# Patient Record
Sex: MALE | Race: WHITE | NOT HISPANIC OR LATINO | URBAN - METROPOLITAN AREA
[De-identification: names, ages, dates, MRNs, and addresses within clinical notes are randomized per-mention and may not be internally consistent; named-entity substitution may affect disease eponyms.]

---

## 2017-02-01 ENCOUNTER — OFFICE VISIT - HEALTHEAST (OUTPATIENT)
Dept: FAMILY MEDICINE | Facility: CLINIC | Age: 32
End: 2017-02-01

## 2017-02-01 DIAGNOSIS — I10 ESSENTIAL HYPERTENSION WITH GOAL BLOOD PRESSURE LESS THAN 140/90: ICD-10-CM

## 2017-02-01 DIAGNOSIS — K21.9 GERD (GASTROESOPHAGEAL REFLUX DISEASE): ICD-10-CM

## 2017-02-01 ASSESSMENT — MIFFLIN-ST. JEOR: SCORE: 2093.59

## 2017-06-07 ENCOUNTER — COMMUNICATION - HEALTHEAST (OUTPATIENT)
Dept: FAMILY MEDICINE | Facility: CLINIC | Age: 32
End: 2017-06-07

## 2017-06-07 DIAGNOSIS — K21.9 GERD (GASTROESOPHAGEAL REFLUX DISEASE): ICD-10-CM

## 2017-11-24 ENCOUNTER — COMMUNICATION - HEALTHEAST (OUTPATIENT)
Dept: FAMILY MEDICINE | Facility: CLINIC | Age: 32
End: 2017-11-24

## 2017-12-18 ENCOUNTER — COMMUNICATION - HEALTHEAST (OUTPATIENT)
Dept: FAMILY MEDICINE | Facility: CLINIC | Age: 32
End: 2017-12-18

## 2017-12-18 DIAGNOSIS — I10 ESSENTIAL HYPERTENSION: ICD-10-CM

## 2017-12-19 ENCOUNTER — COMMUNICATION - HEALTHEAST (OUTPATIENT)
Dept: FAMILY MEDICINE | Facility: CLINIC | Age: 32
End: 2017-12-19

## 2017-12-19 DIAGNOSIS — I10 HTN (HYPERTENSION): ICD-10-CM

## 2018-02-28 ENCOUNTER — COMMUNICATION - HEALTHEAST (OUTPATIENT)
Dept: FAMILY MEDICINE | Facility: CLINIC | Age: 33
End: 2018-02-28

## 2018-02-28 DIAGNOSIS — K21.9 GERD (GASTROESOPHAGEAL REFLUX DISEASE): ICD-10-CM

## 2018-02-28 DIAGNOSIS — I10 HTN (HYPERTENSION): ICD-10-CM

## 2018-03-05 ENCOUNTER — COMMUNICATION - HEALTHEAST (OUTPATIENT)
Dept: FAMILY MEDICINE | Facility: CLINIC | Age: 33
End: 2018-03-05

## 2018-03-05 DIAGNOSIS — I10 ESSENTIAL HYPERTENSION: ICD-10-CM

## 2018-04-06 ENCOUNTER — COMMUNICATION - HEALTHEAST (OUTPATIENT)
Dept: FAMILY MEDICINE | Facility: CLINIC | Age: 33
End: 2018-04-06

## 2018-04-06 DIAGNOSIS — K21.9 GERD (GASTROESOPHAGEAL REFLUX DISEASE): ICD-10-CM

## 2018-04-14 ENCOUNTER — COMMUNICATION - HEALTHEAST (OUTPATIENT)
Dept: FAMILY MEDICINE | Facility: CLINIC | Age: 33
End: 2018-04-14

## 2018-04-14 DIAGNOSIS — K21.9 GERD (GASTROESOPHAGEAL REFLUX DISEASE): ICD-10-CM

## 2018-04-14 DIAGNOSIS — I10 HTN (HYPERTENSION): ICD-10-CM

## 2018-04-18 ENCOUNTER — COMMUNICATION - HEALTHEAST (OUTPATIENT)
Dept: FAMILY MEDICINE | Facility: CLINIC | Age: 33
End: 2018-04-18

## 2018-04-18 DIAGNOSIS — I10 HTN (HYPERTENSION): ICD-10-CM

## 2018-04-22 ENCOUNTER — COMMUNICATION - HEALTHEAST (OUTPATIENT)
Dept: FAMILY MEDICINE | Facility: CLINIC | Age: 33
End: 2018-04-22

## 2018-04-22 DIAGNOSIS — I10 ESSENTIAL HYPERTENSION: ICD-10-CM

## 2018-04-23 ENCOUNTER — COMMUNICATION - HEALTHEAST (OUTPATIENT)
Dept: FAMILY MEDICINE | Facility: CLINIC | Age: 33
End: 2018-04-23

## 2018-04-23 DIAGNOSIS — I10 ESSENTIAL HYPERTENSION: ICD-10-CM

## 2018-05-23 ENCOUNTER — COMMUNICATION - HEALTHEAST (OUTPATIENT)
Dept: FAMILY MEDICINE | Facility: CLINIC | Age: 33
End: 2018-05-23

## 2018-05-23 ENCOUNTER — OFFICE VISIT - HEALTHEAST (OUTPATIENT)
Dept: FAMILY MEDICINE | Facility: CLINIC | Age: 33
End: 2018-05-23

## 2018-05-23 ENCOUNTER — COMMUNICATION - HEALTHEAST (OUTPATIENT)
Dept: TELEHEALTH | Facility: CLINIC | Age: 33
End: 2018-05-23

## 2018-05-23 DIAGNOSIS — K21.9 GERD (GASTROESOPHAGEAL REFLUX DISEASE): ICD-10-CM

## 2018-05-23 DIAGNOSIS — I10 ESSENTIAL HYPERTENSION: ICD-10-CM

## 2018-05-23 DIAGNOSIS — Z00.00 HEALTHCARE MAINTENANCE: ICD-10-CM

## 2018-05-23 DIAGNOSIS — R73.09 OTHER ABNORMAL GLUCOSE: ICD-10-CM

## 2018-05-23 DIAGNOSIS — E78.2 MIXED HYPERLIPIDEMIA: ICD-10-CM

## 2018-05-23 DIAGNOSIS — F84.0 AUTISM SPECTRUM DISORDER: ICD-10-CM

## 2018-05-23 LAB
ALT SERPL W P-5'-P-CCNC: 21 U/L (ref 0–45)
ANION GAP SERPL CALCULATED.3IONS-SCNC: 14 MMOL/L (ref 5–18)
BUN SERPL-MCNC: 24 MG/DL (ref 8–22)
CALCIUM SERPL-MCNC: 10.1 MG/DL (ref 8.5–10.5)
CHLORIDE BLD-SCNC: 103 MMOL/L (ref 98–107)
CHOLEST SERPL-MCNC: 221 MG/DL
CO2 SERPL-SCNC: 26 MMOL/L (ref 22–31)
CREAT SERPL-MCNC: 1.02 MG/DL (ref 0.7–1.3)
FASTING STATUS PATIENT QL REPORTED: NO
GFR SERPL CREATININE-BSD FRML MDRD: >60 ML/MIN/1.73M2
GLUCOSE BLD-MCNC: 74 MG/DL (ref 70–125)
HBA1C MFR BLD: 5.7 % (ref 3.5–6)
HDLC SERPL-MCNC: 55 MG/DL
LDLC SERPL CALC-MCNC: 127 MG/DL
POTASSIUM BLD-SCNC: 3.9 MMOL/L (ref 3.5–5)
SODIUM SERPL-SCNC: 143 MMOL/L (ref 136–145)
TRIGL SERPL-MCNC: 196 MG/DL

## 2018-05-23 NOTE — ASSESSMENT & PLAN NOTE
Only diagnosed with Asperger's, not seeing mental health professional currently and reports doing well.  Mood issues about a year ago when his brother took his own life.  Patient states he is interacting well with coworkers.  Has history of intermittent explosive disorder but denies recent problems

## 2018-05-23 NOTE — ASSESSMENT & PLAN NOTE
Exercising at least 30 minutes 5 days a week?  No, needs to work on this, works at [employer] so job is active but agrees that he should do more exercise outside of work  Assessment of/ comments on efforts to lose weight (if applicable): Trying to eat well  Assessment of/comments on diet: States eats lots of vegetables and does well with this  Lab Results   Component Value Date    HGBA1C 5.3 04/11/2011     Plan: A1c ordered today, recommend more exercise  Follow-up: With blood pressure follow-up

## 2018-05-23 NOTE — ASSESSMENT & PLAN NOTE
Hypertension is poorly controlled  BP Readings from Last 3 Encounters:   05/23/18 152/72   02/01/17 120/74   08/01/16 134/74     Comment: Patient works nights and it has been a long time since he took his last dose of medication.  Also, concerned about diastolic blood pressure being too low at recent trip to the dentist  Current antihypertensives amlodipine 10, losartan 100, hydrochlorothiazide 25  Plan: Recommend checking blood pressures at home, following up if remains above 140 or above 90.  Discussed goal being around 130/80.  Follow-up 2 weeks for nurse only visit to recheck blood pressure.  No change in medication at this time, continue to work on diet and exercise.  Basic metabolic profile ordered  Follow-up: 6 months and as above

## 2018-05-24 ENCOUNTER — COMMUNICATION - HEALTHEAST (OUTPATIENT)
Dept: FAMILY MEDICINE | Facility: CLINIC | Age: 33
End: 2018-05-24

## 2018-05-28 ENCOUNTER — COMMUNICATION - HEALTHEAST (OUTPATIENT)
Dept: FAMILY MEDICINE | Facility: CLINIC | Age: 33
End: 2018-05-28

## 2018-05-28 DIAGNOSIS — I10 ESSENTIAL HYPERTENSION: ICD-10-CM

## 2018-06-12 ENCOUNTER — AMBULATORY - HEALTHEAST (OUTPATIENT)
Dept: NURSING | Facility: CLINIC | Age: 33
End: 2018-06-12

## 2018-07-07 ENCOUNTER — COMMUNICATION - HEALTHEAST (OUTPATIENT)
Dept: FAMILY MEDICINE | Facility: CLINIC | Age: 33
End: 2018-07-07

## 2018-07-07 DIAGNOSIS — I10 HTN (HYPERTENSION): ICD-10-CM

## 2018-07-07 DIAGNOSIS — K21.9 GERD (GASTROESOPHAGEAL REFLUX DISEASE): ICD-10-CM

## 2018-08-13 ENCOUNTER — COMMUNICATION - HEALTHEAST (OUTPATIENT)
Dept: FAMILY MEDICINE | Facility: CLINIC | Age: 33
End: 2018-08-13

## 2018-08-13 DIAGNOSIS — K21.9 GERD (GASTROESOPHAGEAL REFLUX DISEASE): ICD-10-CM

## 2018-08-13 DIAGNOSIS — I10 HTN (HYPERTENSION): ICD-10-CM

## 2019-01-16 ENCOUNTER — COMMUNICATION - HEALTHEAST (OUTPATIENT)
Dept: FAMILY MEDICINE | Facility: CLINIC | Age: 34
End: 2019-01-16

## 2019-01-16 DIAGNOSIS — I10 ESSENTIAL HYPERTENSION: ICD-10-CM

## 2019-08-26 ENCOUNTER — COMMUNICATION - HEALTHEAST (OUTPATIENT)
Dept: FAMILY MEDICINE | Facility: CLINIC | Age: 34
End: 2019-08-26

## 2019-08-26 DIAGNOSIS — I10 HTN (HYPERTENSION): ICD-10-CM

## 2019-09-10 ENCOUNTER — COMMUNICATION - HEALTHEAST (OUTPATIENT)
Dept: FAMILY MEDICINE | Facility: CLINIC | Age: 34
End: 2019-09-10

## 2019-09-10 DIAGNOSIS — I10 ESSENTIAL HYPERTENSION: ICD-10-CM

## 2019-10-08 ENCOUNTER — COMMUNICATION - HEALTHEAST (OUTPATIENT)
Dept: FAMILY MEDICINE | Facility: CLINIC | Age: 34
End: 2019-10-08

## 2020-03-03 ENCOUNTER — COMMUNICATION - HEALTHEAST (OUTPATIENT)
Dept: FAMILY MEDICINE | Facility: CLINIC | Age: 35
End: 2020-03-03

## 2020-03-03 DIAGNOSIS — I10 ESSENTIAL HYPERTENSION: ICD-10-CM

## 2020-04-13 ENCOUNTER — COMMUNICATION - HEALTHEAST (OUTPATIENT)
Dept: FAMILY MEDICINE | Facility: CLINIC | Age: 35
End: 2020-04-13

## 2020-04-13 DIAGNOSIS — I10 ESSENTIAL HYPERTENSION: ICD-10-CM

## 2020-06-04 ENCOUNTER — COMMUNICATION - HEALTHEAST (OUTPATIENT)
Dept: FAMILY MEDICINE | Facility: CLINIC | Age: 35
End: 2020-06-04

## 2020-06-04 DIAGNOSIS — I10 ESSENTIAL HYPERTENSION: ICD-10-CM

## 2020-07-09 ENCOUNTER — COMMUNICATION - HEALTHEAST (OUTPATIENT)
Dept: FAMILY MEDICINE | Facility: CLINIC | Age: 35
End: 2020-07-09

## 2020-07-09 DIAGNOSIS — I10 ESSENTIAL HYPERTENSION: ICD-10-CM

## 2020-07-21 ENCOUNTER — COMMUNICATION - HEALTHEAST (OUTPATIENT)
Dept: FAMILY MEDICINE | Facility: CLINIC | Age: 35
End: 2020-07-21

## 2020-08-29 ENCOUNTER — COMMUNICATION - HEALTHEAST (OUTPATIENT)
Dept: FAMILY MEDICINE | Facility: CLINIC | Age: 35
End: 2020-08-29

## 2020-08-29 DIAGNOSIS — I10 ESSENTIAL HYPERTENSION: ICD-10-CM

## 2020-09-09 ENCOUNTER — COMMUNICATION - HEALTHEAST (OUTPATIENT)
Dept: FAMILY MEDICINE | Facility: CLINIC | Age: 35
End: 2020-09-09

## 2020-09-21 ENCOUNTER — COMMUNICATION - HEALTHEAST (OUTPATIENT)
Dept: FAMILY MEDICINE | Facility: CLINIC | Age: 35
End: 2020-09-21

## 2020-09-21 DIAGNOSIS — I10 HTN (HYPERTENSION): ICD-10-CM

## 2020-09-28 ENCOUNTER — COMMUNICATION - HEALTHEAST (OUTPATIENT)
Dept: FAMILY MEDICINE | Facility: CLINIC | Age: 35
End: 2020-09-28

## 2020-10-13 ENCOUNTER — COMMUNICATION - HEALTHEAST (OUTPATIENT)
Dept: FAMILY MEDICINE | Facility: CLINIC | Age: 35
End: 2020-10-13

## 2020-10-13 DIAGNOSIS — I10 ESSENTIAL HYPERTENSION: ICD-10-CM

## 2020-10-13 RX ORDER — AMLODIPINE BESYLATE 10 MG/1
TABLET ORAL
Qty: 30 TABLET | Refills: 11 | Status: SHIPPED | OUTPATIENT
Start: 2020-10-13 | End: 2021-10-21

## 2020-10-18 ENCOUNTER — COMMUNICATION - HEALTHEAST (OUTPATIENT)
Dept: FAMILY MEDICINE | Facility: CLINIC | Age: 35
End: 2020-10-18

## 2020-10-18 DIAGNOSIS — I10 HTN (HYPERTENSION): ICD-10-CM

## 2020-10-28 ENCOUNTER — OFFICE VISIT - HEALTHEAST (OUTPATIENT)
Dept: FAMILY MEDICINE | Facility: CLINIC | Age: 35
End: 2020-10-28

## 2020-10-28 DIAGNOSIS — I10 ESSENTIAL HYPERTENSION: ICD-10-CM

## 2020-10-28 DIAGNOSIS — R73.09 OTHER ABNORMAL GLUCOSE: ICD-10-CM

## 2020-10-28 DIAGNOSIS — E78.2 MIXED HYPERLIPIDEMIA: ICD-10-CM

## 2020-10-28 DIAGNOSIS — Z00.00 HEALTHCARE MAINTENANCE: ICD-10-CM

## 2020-10-28 DIAGNOSIS — F17.200 TOBACCO USE DISORDER: ICD-10-CM

## 2020-10-28 NOTE — ASSESSMENT & PLAN NOTE
Poorly controlled here, even on recheck was 142/80.  Patient on maximum dose of 3 antihypertensives prescribed, [id number], losartan 100 and Norvasc.-Though could go up on hydrochlorothiazide.  I think patient should get a home blood pressure monitor, check blood pressures, bring it in in 1 month to review and calibrate.  He is agreeable.  We will hold off on labs including BMP until this time as well.

## 2020-11-02 ENCOUNTER — COMMUNICATION - HEALTHEAST (OUTPATIENT)
Dept: FAMILY MEDICINE | Facility: CLINIC | Age: 35
End: 2020-11-02

## 2020-11-02 DIAGNOSIS — I10 HTN (HYPERTENSION): ICD-10-CM

## 2020-11-25 ENCOUNTER — COMMUNICATION - HEALTHEAST (OUTPATIENT)
Dept: FAMILY MEDICINE | Facility: CLINIC | Age: 35
End: 2020-11-25

## 2020-11-25 DIAGNOSIS — I10 ESSENTIAL HYPERTENSION: ICD-10-CM

## 2020-12-09 ENCOUNTER — OFFICE VISIT - HEALTHEAST (OUTPATIENT)
Dept: FAMILY MEDICINE | Facility: CLINIC | Age: 35
End: 2020-12-09

## 2020-12-09 DIAGNOSIS — Z00.00 HEALTHCARE MAINTENANCE: ICD-10-CM

## 2020-12-09 DIAGNOSIS — E78.2 MIXED HYPERLIPIDEMIA: ICD-10-CM

## 2020-12-09 DIAGNOSIS — I10 ESSENTIAL HYPERTENSION: ICD-10-CM

## 2020-12-09 DIAGNOSIS — R73.09 OTHER ABNORMAL GLUCOSE: ICD-10-CM

## 2020-12-09 LAB
ALT SERPL W P-5'-P-CCNC: 26 U/L (ref 0–45)
ANION GAP SERPL CALCULATED.3IONS-SCNC: 12 MMOL/L (ref 5–18)
BUN SERPL-MCNC: 23 MG/DL (ref 8–22)
CALCIUM SERPL-MCNC: 9.3 MG/DL (ref 8.5–10.5)
CHLORIDE BLD-SCNC: 102 MMOL/L (ref 98–107)
CHOLEST SERPL-MCNC: 230 MG/DL
CO2 SERPL-SCNC: 27 MMOL/L (ref 22–31)
CREAT SERPL-MCNC: 1.13 MG/DL (ref 0.7–1.3)
FASTING STATUS PATIENT QL REPORTED: YES
GFR SERPL CREATININE-BSD FRML MDRD: >60 ML/MIN/1.73M2
GLUCOSE BLD-MCNC: 81 MG/DL (ref 70–125)
HBA1C MFR BLD: 5.4 %
HCV AB SERPL QL IA: NEGATIVE
HDLC SERPL-MCNC: 58 MG/DL
LDLC SERPL CALC-MCNC: 153 MG/DL
POTASSIUM BLD-SCNC: 3.9 MMOL/L (ref 3.5–5)
SODIUM SERPL-SCNC: 141 MMOL/L (ref 136–145)
TRIGL SERPL-MCNC: 94 MG/DL

## 2020-12-09 ASSESSMENT — MIFFLIN-ST. JEOR: SCORE: 2042.56

## 2020-12-10 ENCOUNTER — COMMUNICATION - HEALTHEAST (OUTPATIENT)
Dept: FAMILY MEDICINE | Facility: CLINIC | Age: 35
End: 2020-12-10

## 2020-12-30 ENCOUNTER — COMMUNICATION - HEALTHEAST (OUTPATIENT)
Dept: FAMILY MEDICINE | Facility: CLINIC | Age: 35
End: 2020-12-30

## 2020-12-30 DIAGNOSIS — I10 ESSENTIAL HYPERTENSION: ICD-10-CM

## 2021-02-01 ENCOUNTER — COMMUNICATION - HEALTHEAST (OUTPATIENT)
Dept: FAMILY MEDICINE | Facility: CLINIC | Age: 36
End: 2021-02-01

## 2021-02-01 DIAGNOSIS — I10 ESSENTIAL HYPERTENSION: ICD-10-CM

## 2021-02-01 RX ORDER — LOSARTAN POTASSIUM 100 MG/1
TABLET ORAL
Qty: 30 TABLET | Refills: 10 | Status: SHIPPED | OUTPATIENT
Start: 2021-02-01 | End: 2022-02-15

## 2021-02-18 ENCOUNTER — COMMUNICATION - HEALTHEAST (OUTPATIENT)
Dept: FAMILY MEDICINE | Facility: CLINIC | Age: 36
End: 2021-02-18

## 2021-02-18 DIAGNOSIS — I10 HTN (HYPERTENSION): ICD-10-CM

## 2021-02-18 RX ORDER — HYDROCHLOROTHIAZIDE 25 MG/1
TABLET ORAL
Qty: 90 TABLET | Refills: 2 | Status: SHIPPED | OUTPATIENT
Start: 2021-02-18 | End: 2022-01-17

## 2021-05-26 ENCOUNTER — RECORDS - HEALTHEAST (OUTPATIENT)
Dept: ADMINISTRATIVE | Facility: CLINIC | Age: 36
End: 2021-05-26

## 2021-05-29 ENCOUNTER — RECORDS - HEALTHEAST (OUTPATIENT)
Dept: ADMINISTRATIVE | Facility: CLINIC | Age: 36
End: 2021-05-29

## 2021-05-30 VITALS — WEIGHT: 244 LBS | BODY MASS INDEX: 31.32 KG/M2 | HEIGHT: 74 IN

## 2021-05-31 NOTE — TELEPHONE ENCOUNTER
RN cannot approve Refill Request    RN can NOT refill this medication Protocol failed and NO refill given.    Terese Finn, [facility] Triage/Med Refill 8/26/2019    Requested Prescriptions   Pending Prescriptions Disp Refills     hydroCHLOROthiazide (HYDRODIURIL) 25 MG tablet [Pharmacy Med Name: HYDROCHLOROTHIAZIDE 25 MG TAB] 90 tablet 3     Sig: TAKE ONE TABLET BY MOUTH DAILY       Diuretics/Combination Diuretics Refill Protocol  Failed - 8/26/2019  9:24 AM        Failed - Visit with PCP or prescribing provider visit in past 12 months     Last office visit with prescriber/PCP: 5/23/2018 Daniel Duke MD OR same dept: Visit date not found OR same specialty: 5/23/2018 Daniel Duke MD  Last physical: 8/1/2016 Last MTM visit: Visit date not found   Next visit within 3 mo: Visit date not found  Next physical within 3 mo: Visit date not found  Prescriber OR PCP: Daniel Duke MD  Last diagnosis associated with med order: 1. HTN (hypertension)  - hydroCHLOROthiazide (HYDRODIURIL) 25 MG tablet [Pharmacy Med Name: HYDROCHLOROTHIAZIDE 25 MG TAB]; TAKE ONE TABLET BY MOUTH DAILY  Dispense: 90 tablet; Refill: 0    If protocol passes may refill for 12 months if within 3 months of last provider visit (or a total of 15 months).             Failed - Serum Potassium in past 12 months      No results found for: LN-POTASSIUM          Failed - Serum Sodium in past 12 months      No results found for: LN-SODIUM          Failed - Blood pressure on file in past 12 months     BP Readings from Last 1 Encounters:   06/12/18 128/80             Failed - Serum Creatinine in past 12 months      Creatinine   Date Value Ref Range Status   05/23/2018 1.02 0.70 - 1.30 mg/dL Final

## 2021-06-01 VITALS — BODY MASS INDEX: 30.94 KG/M2 | WEIGHT: 237.75 LBS

## 2021-06-01 NOTE — TELEPHONE ENCOUNTER
RN cannot approve Refill Request    RN can NOT refill this medication Protocol failed and NO refill given.      Terese Finn, [facility] Triage/Med Refill 9/11/2019    Requested Prescriptions   Pending Prescriptions Disp Refills     amLODIPine (NORVASC) 10 MG tablet [Pharmacy Med Name: AMLODIPINE BESYLATE 10 MG TABZ] 90 tablet 3     Sig: TAKE ONE TABLET BY MOUTH ONCE DAILY       Calcium-Channel Blockers Protocol Failed - 9/10/2019 11:28 AM        Failed - PCP or prescribing provider visit in past 12 months or next 3 months     Last office visit with prescriber/PCP: 5/23/2018 Daniel Duke MD OR same dept: Visit date not found OR same specialty: 5/23/2018 Daniel Duke MD  Last physical: 8/1/2016 Last MTM visit: Visit date not found   Next visit within 3 mo: Visit date not found  Next physical within 3 mo: Visit date not found  Prescriber OR PCP: Daniel Duke MD  Last diagnosis associated with med order: 1. Essential hypertension  - amLODIPine (NORVASC) 10 MG tablet [Pharmacy Med Name: AMLODIPINE BESYLATE 10 MG TABZ]; TAKE ONE TABLET BY MOUTH ONCE DAILY  Dispense: 90 tablet; Refill: 0  - losartan (COZAAR) 100 MG tablet [Pharmacy Med Name: LOSARTAN POTASSIUM 100 MG TAB]; TAKE 1 TABLET BY MOUTH DAILY  Dispense: 90 tablet; Refill: 0    If protocol passes may refill for 12 months if within 3 months of last provider visit (or a total of 15 months).             Failed - Blood pressure filed in past 12 months     BP Readings from Last 1 Encounters:   06/12/18 128/80             losartan (COZAAR) 100 MG tablet [Pharmacy Med Name: LOSARTAN POTASSIUM 100 MG TAB] 90 tablet 3     Sig: TAKE 1 TABLET BY MOUTH DAILY       Angiotensin Receptor Blocker Protocol Failed - 9/10/2019 11:28 AM        Failed - PCP or prescribing provider visit in past 12 months       Last office visit with prescriber/PCP: 5/23/2018 Daniel Duke MD OR same dept: Visit date not found OR same specialty: 5/23/2018 Srikanth  Daniel Teran MD  Last physical: 8/1/2016 Last MTM visit: Visit date not found   Next visit within 3 mo: Visit date not found  Next physical within 3 mo: Visit date not found  Prescriber OR PCP: Daniel Duke MD  Last diagnosis associated with med order: 1. Essential hypertension  - amLODIPine (NORVASC) 10 MG tablet [Pharmacy Med Name: AMLODIPINE BESYLATE 10 MG TABZ]; TAKE ONE TABLET BY MOUTH ONCE DAILY  Dispense: 90 tablet; Refill: 0  - losartan (COZAAR) 100 MG tablet [Pharmacy Med Name: LOSARTAN POTASSIUM 100 MG TAB]; TAKE 1 TABLET BY MOUTH DAILY  Dispense: 90 tablet; Refill: 0    If protocol passes may refill for 12 months if within 3 months of last provider visit (or a total of 15 months).             Failed - Serum potassium within the past 12 months     No results found for: LN-POTASSIUM          Failed - Blood pressure filed in past 12 months     BP Readings from Last 1 Encounters:   06/12/18 128/80             Failed - Serum creatinine within the past 12 months     Creatinine   Date Value Ref Range Status   05/23/2018 1.02 0.70 - 1.30 mg/dL Final

## 2021-06-02 NOTE — TELEPHONE ENCOUNTER
Left message #3 at [phone] for patient to call clinic to schedule appt with PCP for med follow up. We have made several attempts to contact patient by phone and letter to schedule an appointment. Unfortunately, our calls have not been returned and we were unable to schedule. At this time, we will no longer make an attempt to schedule this appointment. Completing task.

## 2021-06-02 NOTE — TELEPHONE ENCOUNTER
Left message #2 at [phone] for patient to call clinic to schedule appt with PCP for medication check. Sending letter out and postponing task out to 2 weeks and will try again if an appointment hasn't been made.

## 2021-06-05 VITALS
HEART RATE: 72 BPM | RESPIRATION RATE: 18 BRPM | TEMPERATURE: 98.1 F | SYSTOLIC BLOOD PRESSURE: 132 MMHG | HEIGHT: 72 IN | WEIGHT: 238 LBS | BODY MASS INDEX: 32.23 KG/M2 | DIASTOLIC BLOOD PRESSURE: 75 MMHG

## 2021-06-05 VITALS
DIASTOLIC BLOOD PRESSURE: 80 MMHG | HEART RATE: 67 BPM | WEIGHT: 237 LBS | BODY MASS INDEX: 30.84 KG/M2 | SYSTOLIC BLOOD PRESSURE: 142 MMHG | RESPIRATION RATE: 16 BRPM | TEMPERATURE: 97.8 F

## 2021-06-06 NOTE — TELEPHONE ENCOUNTER
RN cannot approve Refill Request    RN can NOT refill this medication Protocol failed and NO refill given.      Terese Finn, [facility] Triage/Med Refill 3/4/2020    Requested Prescriptions   Pending Prescriptions Disp Refills     losartan (COZAAR) 100 MG tablet [Pharmacy Med Name: LOSARTAN POTASSIUM 100 MG TAB] 30 tablet 0     Sig: TAKE 1 TABLET BY MOUTH DAILY       Angiotensin Receptor Blocker Protocol Failed - 3/3/2020  2:33 PM        Failed - PCP or prescribing provider visit in past 12 months       Last office visit with prescriber/PCP: 5/23/2018 Daniel Duke MD OR same dept: Visit date not found OR same specialty: 5/23/2018 Daniel Duke MD  Last physical: 8/1/2016 Last MTM visit: Visit date not found   Next visit within 3 mo: Visit date not found  Next physical within 3 mo: Visit date not found  Prescriber OR PCP: Daniel Duke MD  Last diagnosis associated with med order: 1. Essential hypertension  - losartan (COZAAR) 100 MG tablet [Pharmacy Med Name: LOSARTAN POTASSIUM 100 MG TAB]; TAKE 1 TABLET BY MOUTH DAILY  Dispense: 30 tablet; Refill: 0  - amLODIPine (NORVASC) 10 MG tablet [Pharmacy Med Name: AMLODIPINE BESYLATE 10 MG TAB]; TAKE ONE TABLET BY MOUTH ONCE DAILY  Dispense: 30 tablet; Refill: 0    If protocol passes may refill for 12 months if within 3 months of last provider visit (or a total of 15 months).             Failed - Serum potassium within the past 12 months     No results found for: LN-POTASSIUM          Failed - Blood pressure filed in past 12 months     BP Readings from Last 1 Encounters:   06/12/18 128/80             Failed - Serum creatinine within the past 12 months     Creatinine   Date Value Ref Range Status   05/23/2018 1.02 0.70 - 1.30 mg/dL Final             amLODIPine (NORVASC) 10 MG tablet [Pharmacy Med Name: AMLODIPINE BESYLATE 10 MG TAB] 30 tablet 0     Sig: TAKE ONE TABLET BY MOUTH ONCE DAILY       Calcium-Channel Blockers Protocol Failed - 3/3/2020   2:33 PM        Failed - PCP or prescribing provider visit in past 12 months or next 3 months     Last office visit with prescriber/PCP: 5/23/2018 Daniel Duke MD OR same dept: Visit date not found OR same specialty: 5/23/2018 Daniel Duke MD  Last physical: 8/1/2016 Last MTM visit: Visit date not found   Next visit within 3 mo: Visit date not found  Next physical within 3 mo: Visit date not found  Prescriber OR PCP: Daniel Duke MD  Last diagnosis associated with med order: 1. Essential hypertension  - losartan (COZAAR) 100 MG tablet [Pharmacy Med Name: LOSARTAN POTASSIUM 100 MG TAB]; TAKE 1 TABLET BY MOUTH DAILY  Dispense: 30 tablet; Refill: 0  - amLODIPine (NORVASC) 10 MG tablet [Pharmacy Med Name: AMLODIPINE BESYLATE 10 MG TAB]; TAKE ONE TABLET BY MOUTH ONCE DAILY  Dispense: 30 tablet; Refill: 0    If protocol passes may refill for 12 months if within 3 months of last provider visit (or a total of 15 months).             Failed - Blood pressure filed in past 12 months     BP Readings from Last 1 Encounters:   06/12/18 128/80

## 2021-06-07 NOTE — TELEPHONE ENCOUNTER
Please contact patient and schedule follow-up visit: telephone or video Please have them check blood pressure and have readings available to discuss I have renewed the medication for a limited time.

## 2021-06-08 NOTE — PROGRESS NOTES
"Assessment/ Plan  1. GERD (gastroesophageal reflux disease)  Omeprazole 20 mg, 1 month and when necessary    2. Essential hypertension with goal blood pressure less than 140/90  No change, up-to-date on labs.      Body mass index is 31.76 kg/(m^2).  The following high BMI interventions were performed this visit: encouragement to exercise  Subjective  CC:  Chief Complaint   Patient presents with     Hypertension     follow up     HPI:  HTN  Abdominal Pain  Narrative burning epigastric and chest pain, mostly after eating  ----------------  Notes:  Duration/ onset: 2-3 weeks  ; got constipated  Location: epigastric  Severity/ Quality: burning- comes and goes, always  Similar problem in the past?  No  Anything seem to make it worse? Food makes it worse  Anything make it better? no  Other comments does not get worse with exercise, in fact, maybe better.     Hypertension  Narrative/ comments: Hydrochlorothiazide 25, amlodipine 10, losartan 100, doing well, does not check blood pressures regularly  Antihypertensive meds: As above  Vitals:    02/01/17 1019   BP: 120/74   Patient Site: Right Arm   Patient Position: Sitting   Cuff Size: Adult Large   Pulse: 60   Resp: 20   Temp: 98.3  F (36.8  C)   TempSrc: Oral   Weight: (!) 244 lb (110.7 kg)   Height: 6' 1.5\" (1.867 m)       -----------------------    PFSH:  Current medications reviewed as follows:  Current Outpatient Prescriptions on File Prior to Visit   Medication Sig     amLODIPine (NORVASC) 10 MG tablet TAKE ONE TABLET BY MOUTH DAILY     hydroCHLOROthiazide (HYDRODIURIL) 25 MG tablet TAKE ONE TABLET BY MOUTH DAILY     losartan (COZAAR) 100 MG tablet TAKE 1 TABLET ORALLY DAILY     multivitamin therapeutic (THERAGRAN) tablet Take 1 tablet by mouth daily.     No current facility-administered medications on file prior to visit.      Patient Active Problem List   Diagnosis     Obesity     Nicotine Dependence     Intermittent explosive disorder     Essential hypertension     " "Hyperglycemia     Asperger's Disorder     Allergic Rhinitis     Acute Pharyngitis     Migraine Headache     Hyperlipidemia     History   Smoking Status     Former Smoker   Smokeless Tobacco     Not on file     Social History     Social History Narrative     Patient Care Team:  Daniel Duke MD as PCP - General  ROS  Full 10 system review including constitutional, respiratory, cardiac, gi, urinary, rheumatologic, neurologic, reproductive, dermatologic psychiatric is  performed (via questionnaire) and is negative except heartburn indigestion as discussed      Objective  Physical Exam  Vitals:    02/01/17 1019   BP: 120/74   Patient Site: Right Arm   Patient Position: Sitting   Cuff Size: Adult Large   Pulse: 60   Resp: 20   Temp: 98.3  F (36.8  C)   TempSrc: Oral   Weight: (!) 244 lb (110.7 kg)   Height: 6' 1.5\" (1.867 m)     Gen- alert, oriented/ appropriately responsive  HEENT- normal cephalic, atraumatic.   Chest- Normal inspiration and expiration.  Clear to ascultation.  No chest wall deformity or scar.    CV- Heart regular rate and rhythm, normal tones, no murmurs gallops or rubs  Abdomen appearance is normal and is soft and nontender.  No noted rebound or guarding.  There is no organomegaly or mass noted.  Bowel sounds are normal.  .  Ext- appear well perfused, no edema  Skin- warm and dry, no visualized rash    Diagnostics  None    Please note: Voice recognition software was used in this dictation.  It may therefore contain typographical errors.  Reviewed labs from 8/1/16.  Lipid was done at this time and was acceptable, also ALT and BMP    "

## 2021-06-09 ENCOUNTER — OFFICE VISIT - HEALTHEAST (OUTPATIENT)
Dept: FAMILY MEDICINE | Facility: CLINIC | Age: 36
End: 2021-06-09

## 2021-06-09 DIAGNOSIS — I10 ESSENTIAL HYPERTENSION: ICD-10-CM

## 2021-06-09 DIAGNOSIS — Z00.00 HEALTHCARE MAINTENANCE: ICD-10-CM

## 2021-06-09 DIAGNOSIS — E78.2 MIXED HYPERLIPIDEMIA: ICD-10-CM

## 2021-06-09 NOTE — ASSESSMENT & PLAN NOTE
Blood pressure well controlled on amlodipine, hydrochlorothiazide and losartan, upon second check today.  He checks his blood pressureat home periodically and generally under good control.  Last BMP was in December.  Has had very stable potassiums.  No changes, follow-up 1 year

## 2021-06-09 NOTE — TELEPHONE ENCOUNTER
Left message #2 at [phone]. Sending letter out and postponing task out to 2 weeks and will try again if an appointment hasn't been made.

## 2021-06-09 NOTE — ASSESSMENT & PLAN NOTE
Has not had Covid shot, plans to get it at some point but does not want to schedule it at this time.  Not sexually active, has declined HIV test.

## 2021-06-10 NOTE — TELEPHONE ENCOUNTER
Left message #3 at [phone]. We have made several attempts to contact patient by phone and letter to schedule an appointment. Unfortunately, our calls have not been returned and we were unable to schedule. At this time, we will no longer make an attempt to schedule this appointment. Completing task.

## 2021-06-11 NOTE — TELEPHONE ENCOUNTER
Left message #3 at [phone]. We have made several attempts to contact patient by phone and letter to schedule an appointment. Unfortunately, our calls have not been returned and we were unable to schedule. At this time, we will no longer make an attempt to schedule this appointment. Completing task.

## 2021-06-11 NOTE — TELEPHONE ENCOUNTER
RN cannot approve Refill Request    RN can NOT refill this medication PCP messaged that patient is overdue for Office Visit. Last office visit: 5/23/2018 Daniel Duke MD Last Physical: 8/1/2016 Last MTM visit: Visit date not found Last visit same specialty: 5/23/2018 Daniel Duke MD.  Next visit within 3 mo: Visit date not found  Next physical within 3 mo: Visit date not found      Merline Swartz, [facility] Triage/Med Refill 8/29/2020    Requested Prescriptions   Pending Prescriptions Disp Refills     amLODIPine (NORVASC) 10 MG tablet [Pharmacy Med Name: AMLODIPINE BESYLATE 10 MG TAB] 30 tablet 0     Sig: TAKE ONE TABLET BY MOUTH ONCE DAILY       Calcium-Channel Blockers Protocol Failed - 8/29/2020  9:53 AM        Failed - PCP or prescribing provider visit in past 12 months or next 3 months     Last office visit with prescriber/PCP: 5/23/2018 Daniel Duke MD OR same dept: Visit date not found OR same specialty: 5/23/2018 Daniel Duke MD  Last physical: 8/1/2016 Last MTM visit: Visit date not found   Next visit within 3 mo: Visit date not found  Next physical within 3 mo: Visit date not found  Prescriber OR PCP: Daniel Duke MD  Last diagnosis associated with med order: 1. Essential hypertension  - amLODIPine (NORVASC) 10 MG tablet [Pharmacy Med Name: AMLODIPINE BESYLATE 10 MG TAB]; TAKE ONE TABLET BY MOUTH ONCE DAILY  Dispense: 30 tablet; Refill: 0  - losartan (COZAAR) 100 MG tablet [Pharmacy Med Name: LOSARTAN POTASSIUM 100 MG TAB]; TAKE 1 TABLET BY MOUTH DAILY  Dispense: 30 tablet; Refill: 0    If protocol passes may refill for 12 months if within 3 months of last provider visit (or a total of 15 months).             Passed - Blood pressure filed in past 12 months     BP Readings from Last 1 Encounters:   06/10/20 (!) 181/89                losartan (COZAAR) 100 MG tablet [Pharmacy Med Name: LOSARTAN POTASSIUM 100 MG TAB] 30 tablet 0     Sig: TAKE 1 TABLET BY  MOUTH DAILY       Angiotensin Receptor Blocker Protocol Failed - 8/29/2020  9:53 AM        Failed - PCP or prescribing provider visit in past 12 months       Last office visit with prescriber/PCP: 5/23/2018 Daniel Duke MD OR same dept: Visit date not found OR same specialty: 5/23/2018 Daniel Duke MD  Last physical: 8/1/2016 Last MTM visit: Visit date not found   Next visit within 3 mo: Visit date not found  Next physical within 3 mo: Visit date not found  Prescriber OR PCP: Daniel Duke MD  Last diagnosis associated with med order: 1. Essential hypertension  - amLODIPine (NORVASC) 10 MG tablet [Pharmacy Med Name: AMLODIPINE BESYLATE 10 MG TAB]; TAKE ONE TABLET BY MOUTH ONCE DAILY  Dispense: 30 tablet; Refill: 0  - losartan (COZAAR) 100 MG tablet [Pharmacy Med Name: LOSARTAN POTASSIUM 100 MG TAB]; TAKE 1 TABLET BY MOUTH DAILY  Dispense: 30 tablet; Refill: 0    If protocol passes may refill for 12 months if within 3 months of last provider visit (or a total of 15 months).             Failed - Serum potassium within the past 12 months     No results found for: LN-POTASSIUM          Failed - Serum creatinine within the past 12 months     Creatinine   Date Value Ref Range Status   05/23/2018 1.02 0.70 - 1.30 mg/dL Final             Passed - Blood pressure filed in past 12 months     BP Readings from Last 1 Encounters:   06/10/20 (!) 181/89

## 2021-06-11 NOTE — TELEPHONE ENCOUNTER
Left message #2 at [phone]. Sending letter out and postponing task out to 2 weeks and will try again if an appointment hasn't been made.

## 2021-06-11 NOTE — TELEPHONE ENCOUNTER
Left message #2 at [phone]. Sending letter out and postponing task out to 2 weeks and will try again if an appointment hasn't been made.

## 2021-06-12 NOTE — PROGRESS NOTES
Assessment/ Plan  Problem List Items Addressed This Visit        High    Nicotine Dependence     Did not discuss         Essential hypertension - Primary     Poorly controlled here, even on recheck was 142/80.  Patient on maximum dose of 3 antihypertensives prescribed, hydrochlorothiazide 25, losartan 100 and Norvasc.-Though could go up on hydrochlorothiazide.  I think patient should get a home blood pressure monitor, check blood pressures, bring it in in 1 month to review and calibrate.  He is agreeable.  We will hold off on labs including BMP until this time as well.         Hyperglycemia     Encourage exercise, check glucose upon follow-up         Mixed hyperlipidemia     Nonfasting today, lipid and ALT on follow-up, not on medications.            Unprioritized    Healthcare maintenance     [facility] flu shot.             Subjective  CC:  Chief Complaint   Patient presents with     BP medication check     HPI:  35-year-old with history of obesity, intermittent explosive disorder, hypertension, autism spectrum disorder and hyperlipidemia.  For hypertension he takes amlodipine 10 mg, hydrochlorothiazide 25 mg, losartan 100 mg.  He is due for a flu shot, labs    His last visit with me was 5/23/2018.    Patient has been working a lot, 16 hours/day, 6 days a week by his account.  Has student loans to pay off.  PFSH:  Patient Active Problem List   Diagnosis     Obesity     Nicotine Dependence     Intermittent explosive disorder     Essential hypertension     Hyperglycemia     Autism spectrum disorder     Allergic Rhinitis     Acute Pharyngitis     Migraine Headache     Mixed hyperlipidemia     Healthcare maintenance     Current medications reviewed as follows:  Current Outpatient Medications on File Prior to Visit   Medication Sig     amLODIPine (NORVASC) 10 MG tablet TAKE ONE TABLET BY MOUTH ONCE DAILY     hydroCHLOROthiazide (HYDRODIURIL) 25 MG tablet Take 1 tablet (25 mg total) by mouth daily. Needs to be for any  future refills.     losartan (COZAAR) 100 MG tablet TAKE 1 TABLET BY MOUTH DAILY     multivitamin therapeutic (THERAGRAN) tablet Take 1 tablet by mouth daily.     omeprazole (PRILOSEC) 20 MG capsule TAKE ONE CAPSULE BY MOUTH DAILY.     No current facility-administered medications on file prior to visit.      Social History     Tobacco Use   Smoking Status Former Smoker   Smokeless Tobacco Never Used     Social History     Social History Narrative     Not on file     Patient Care Team:  Daniel Duke MD as PCP - General  Daniel Duke MD as Assigned PCP  ROS  As above      Objective  Physical Exam  Vitals:    10/28/20 1040   BP: 156/74   Patient Site: Right Arm   Patient Position: Sitting   Cuff Size: Adult Large   Pulse: 67   Resp: 16   Temp: 97.8  F (36.6  C)   TempSrc: Tympanic   Weight: (!) 237 lb (107.5 kg)     Body mass index is 30.84 kg/m .  Gen- alert, oriented/ appropriately responsive  HEENT- normal cephalic, atraumatic.   Chest- Normal inspiration and expiration.    Clear to ascultation.    No chest wall deformity or scar.  CV- Heart regular rate and rhythm  normal tones, no murmurs   No gallops or rubs.  Ext- appear well perfused, no edema  Skin- warm and dry,   no visualized rash    Diagnostics:   None      Please note: Voice recognition software was used in this dictation.  It may therefore contain typographical errors.

## 2021-06-12 NOTE — TELEPHONE ENCOUNTER
Left message #3 at [phone]. We have made several attempts to contact patient by phone and letter to schedule an appointment. Unfortunately, our calls have not been returned and we were unable to schedule. At this time, we will no longer make an attempt to schedule this appointment. Completing task.

## 2021-06-13 NOTE — PROGRESS NOTES
Assessment/ Plan    Problem List Items Addressed This Visit        High    Essential hypertension - Primary     Now well controlled, losartan 100, Norvasc 10, hydrochlorothiazide 25.  Check BMP.         Relevant Orders    Basic Metabolic Panel    Hyperglycemia     A1c         Relevant Orders    Glycosylated Hemoglobin A1c    Mixed hyperlipidemia     Fasting, check ALT and lipid         Relevant Orders    Lipid Cascade    ALT (SGPT)       Unprioritized    Healthcare maintenance     Agreeable to flu shot, declines HIV, will do hepatitis C.         Relevant Orders    Hepatitis C Antibody (Anti-HCV)        Subjective  CC:  Chief Complaint   Patient presents with     Blood Pressure Check     HPI:  35-year-old with history of autism spectrum disorder, hypertension, hyperlipidemia and prediabetes.  He currently takesLosartan 100, hydrochlorothiazide 25, amlodipine 10.  He is due for labs, also due for flu shot.    No other questions today.  Has been checking blood pressure at home, generally around what it is today, 130s over 70s.    Reluctantly agreeable to flu shot.  Quit smoking in 2006.    PFSH:  Patient Active Problem List   Diagnosis     Obesity     Intermittent explosive disorder     Essential hypertension     Hyperglycemia     Autism spectrum disorder     Allergic Rhinitis     Acute Pharyngitis     Migraine Headache     Mixed hyperlipidemia     Healthcare maintenance     Current medications reviewed as follows:  Current Outpatient Medications on File Prior to Visit   Medication Sig     amLODIPine (NORVASC) 10 MG tablet TAKE ONE TABLET BY MOUTH ONCE DAILY     hydroCHLOROthiazide (HYDRODIURIL) 25 MG tablet TAKE ONE TABLET BY MOUTH DAILY     losartan (COZAAR) 100 MG tablet TAKE 1 TABLET BY MOUTH DAILY     multivitamin therapeutic (THERAGRAN) tablet Take 1 tablet by mouth daily.     omeprazole (PRILOSEC) 20 MG capsule TAKE ONE CAPSULE BY MOUTH DAILY.     No current facility-administered medications on file prior to  visit.      Social History     Tobacco Use   Smoking Status Former Smoker   Smokeless Tobacco Never Used     Social History     Social History Narrative     Not on file     Patient Care Team:  Daniel Duke MD as PCP - General  aDniel Duke MD as Assigned PCP  ROS  Negative cardiac, respiratory      Objective  Physical Exam  Vitals:    12/09/20 1003   BP: 132/75   Patient Site: Left Arm   Patient Position: Sitting   Cuff Size: Adult Regular   Pulse: 72   Resp: 18   Temp: 98.1  F (36.7  C)   TempSrc: Temporal   Weight: (!) 238 lb (108 kg)   Height: 6' (1.829 m)     Body mass index is 32.28 kg/m .  Gen- alert, oriented/ appropriately responsive  HEENT- normal cephalic, atraumatic.   Chest- Normal inspiration and expiration.    Clear to ascultation.    No chest wall deformity or scar.  CV- Heart regular rate and rhythm  normal tones, no murmurs   No gallops or rubs.  Ext- appear well perfused, no edema  Skin- warm and dry,   no visualized rash    Diagnostics:   Pending      Please note: Voice recognition software was used in this dictation.  It may therefore contain typographical errors.

## 2021-06-14 NOTE — TELEPHONE ENCOUNTER
Refill Approved    Rx renewed per Medication Renewal Policy. Medication was last renewed on 12/30/2020.  Last office visit was 12/09/2020 with PCP.    Nancy Best, [facility] Triage/Med Refill 2/1/2021     Requested Prescriptions   Pending Prescriptions Disp Refills     losartan (COZAAR) 100 MG tablet [Pharmacy Med Name: LOSARTAN POTASSIUM 100 MG T 100 Tablet] 30 tablet 0     Sig: TAKE 1 TABLET BY MOUTH DAILY       Angiotensin Receptor Blocker Protocol Passed - 2/1/2021  8:08 AM        Passed - PCP or prescribing provider visit in past 12 months       Last office visit with prescriber/PCP: 12/9/2020 Daniel Duke MD OR same dept: 12/9/2020 Daniel Duke MD OR same specialty: 12/9/2020 Daniel Duke MD  Last physical: Visit date not found Last MTM visit: Visit date not found   Next visit within 3 mo: Visit date not found  Next physical within 3 mo: Visit date not found  Prescriber OR PCP: Daniel Duke MD  Last diagnosis associated with med order: 1. Essential hypertension  - losartan (COZAAR) 100 MG tablet [Pharmacy Med Name: LOSARTAN POTASSIUM 100 MG T 100 Tablet]; TAKE 1 TABLET BY MOUTH DAILY  Dispense: 30 tablet; Refill: 0    If protocol passes may refill for 12 months if within 3 months of last provider visit (or a total of 15 months).             Passed - Serum potassium within the past 12 months     Lab Results   Component Value Date    Potassium 3.9 12/09/2020             Passed - Blood pressure filed in past 12 months     BP Readings from Last 1 Encounters:   12/09/20 132/75             Passed - Serum creatinine within the past 12 months     Creatinine   Date Value Ref Range Status   12/09/2020 1.13 0.70 - 1.30 mg/dL Final                            
Rectal exam deferred

## 2021-06-14 NOTE — TELEPHONE ENCOUNTER
Requested Prescriptions     Pending Prescriptions Disp Refills     losartan (COZAAR) 100 MG tablet [Pharmacy Med Name: LOSARTAN POTASSIUM 100 MG T 100 Tablet] 30 tablet 0     Sig: TAKE 1 TABLET BY MOUTH DAILY

## 2021-06-15 NOTE — TELEPHONE ENCOUNTER
Refill Approved    Rx renewed per Medication Renewal Policy. Medication was last renewed on 11/2/20.    Hany Ferrari, [facility] Connection Triage/Med Refill 2/18/2021     Requested Prescriptions   Pending Prescriptions Disp Refills     hydroCHLOROthiazide (HYDRODIURIL) 25 MG tablet [Pharmacy Med Name: HYDROCHLOROTHIAZIDE 25 MG T 25 Tablet] 90 tablet 0     Sig: TAKE ONE TABLET BY MOUTH DAILY       Diuretics/Combination Diuretics Refill Protocol  Passed - 2/18/2021  8:26 AM        Passed - Visit with PCP or prescribing provider visit in past 12 months     Last office visit with prescriber/PCP: Visit date not found OR same dept: 12/9/2020 Daniel Duke MD OR same specialty: 12/9/2020 Daniel Duke MD  Last physical: Visit date not found Last MTM visit: Visit date not found   Next visit within 3 mo: Visit date not found  Next physical within 3 mo: Visit date not found  Prescriber OR PCP: Taylor Calderon MD  Last diagnosis associated with med order: 1. HTN (hypertension)  - hydroCHLOROthiazide (HYDRODIURIL) 25 MG tablet [Pharmacy Med Name: HYDROCHLOROTHIAZIDE 25 MG T 25 Tablet]; TAKE ONE TABLET BY MOUTH DAILY  Dispense: 90 tablet; Refill: 0    If protocol passes may refill for 12 months if within 3 months of last provider visit (or a total of 15 months).             Passed - Serum Potassium in past 12 months      Lab Results   Component Value Date    Potassium 3.9 12/09/2020             Passed - Serum Sodium in past 12 months      Lab Results   Component Value Date    Sodium 141 12/09/2020             Passed - Blood pressure on file in past 12 months     BP Readings from Last 1 Encounters:   12/09/20 132/75             Passed - Serum Creatinine in past 12 months      Creatinine   Date Value Ref Range Status   12/09/2020 1.13 0.70 - 1.30 mg/dL Final

## 2021-06-16 PROBLEM — Z00.00 HEALTHCARE MAINTENANCE: Status: ACTIVE | Noted: 2018-05-23

## 2021-06-18 NOTE — PROGRESS NOTES
Assessment/ Plan  Problem List Items Addressed This Visit        High    Essential hypertension - Primary     Hypertension is poorly controlled  BP Readings from Last 3 Encounters:   05/23/18 152/72   02/01/17 120/74   08/01/16 134/74     Comment: Patient works nights and it has been a long time since he took his last dose of medication.  Also, concerned about diastolic blood pressure being too low at recent trip to the dentist  Current antihypertensives amlodipine 10, losartan 100, hydrochlorothiazide 25  Plan: Recommend checking blood pressures at home, following up if remains above 140 or above 90.  Discussed goal being around 130/80.  Follow-up 2 weeks for nurse only visit to recheck blood pressure.  No change in medication at this time, continue to work on diet and exercise.  Basic metabolic profile ordered  Follow-up: 6 months and as above           Relevant Orders    Basic Metabolic Panel    Hyperglycemia     Exercising at least 30 minutes 5 days a week?  No, needs to work on this, works at [employer] so job is active but agrees that he should do more exercise outside of work  Assessment of/ comments on efforts to lose weight (if applicable): Trying to eat well  Assessment of/comments on diet: States eats lots of vegetables and does well with this  Lab Results   Component Value Date    HGBA1C 5.3 04/11/2011     Plan: A1c ordered today, recommend more exercise  Follow-up: With blood pressure follow-up           Relevant Orders    Glycosylated Hemoglobin A1c    Autism spectrum disorder     Only diagnosed with Asperger's, not seeing mental health professional currently and reports doing well.  Mood issues about a year ago when his brother took his own life.  Patient states he is interacting well with coworkers.  Has history of intermittent explosive disorder but denies recent problems         Mixed hyperlipidemia     No medication, states that diet is good, not exercising enough, above ideal weight.          Relevant Orders    ALT (SGPT)    Lipid Cascade       Unprioritized    Healthcare maintenance     Adacel given           Other Visit Diagnoses     GERD (gastroesophageal reflux disease)            Subjective  CC:  Chief Complaint   Patient presents with     Follow-up     Med check - No concerns      HPI:  HTN Follow-up    BP meds hydrochlorothiazide 25, amlodipine 10, losartan 100  Any problems with meds? No problems  BP Readings from Last 3 Encounters:   05/23/18 144/80   02/01/17 120/74   08/01/16 134/74     BP readings outside of clinic?  No-   Narrative/ comments: has been a long time since last dose  Results for orders placed or performed in visit on 08/01/16   Basic Metabolic Panel   Result Value Ref Range    Sodium 142 136 - 145 mmol/L    Potassium 4.0 3.5 - 5.0 mmol/L    Chloride 106 98 - 107 mmol/L    CO2 28 22 - 31 mmol/L    Anion Gap, Calculation 8 5 - 18 mmol/L    Glucose 86 70 - 125 mg/dL    Calcium 9.5 8.5 - 10.5 mg/dL    BUN 14 8 - 22 mg/dL    Creatinine 0.94 0.70 - 1.30 mg/dL    GFR MDRD Af Amer >60 >60 mL/min/1.73m2    GFR MDRD Non Af Amer >60 >60 mL/min/1.73m2     ----------------------  AHA 11/2017 goals  Normal less than 120/80  Elevated 120- 129/ <80  Stage I hypertension-130-139/80-89  Stage II hypertension greater than 139/  >89  Treatment goal less than 130/80 all  Prediabetes  Exercising regularly? No- but active job  Assessment of diet? good  If obese, efforts at weight loss? Active   Wt Readings from Last 3 Encounters:   05/23/18 (!) 237 lb 12 oz (107.8 kg)   02/01/17 (!) 244 lb (110.7 kg)   08/01/16 (!) 249 lb (112.9 kg)     History of diabetes education? no  Lab Results   Component Value Date    HGBA1C 5.3 04/11/2011   GERD  Patient assessment of control: no problems  Medication: Omeprazole 20 mg as needed- rarely  Duration of treatment: Long-term but only occasional use  Taking Dietary precautions? spicy foods make where  Frequency of daytime symptoms: Infrequent  Frequency of nighttime  symptoms: Infrequent  History of endoscopy/ Gibbons's?  No history of endoscopy    PFSH:  Patient Active Problem List   Diagnosis     Obesity     Nicotine Dependence     Intermittent explosive disorder     Essential hypertension     Hyperglycemia     Autism spectrum disorder     Allergic Rhinitis     Acute Pharyngitis     Migraine Headache     Mixed hyperlipidemia     Healthcare maintenance     Current medications reviewed as follows:  Current Outpatient Prescriptions on File Prior to Visit   Medication Sig     amLODIPine (NORVASC) 10 MG tablet Take 1 tablet (10 mg total) by mouth daily.     hydroCHLOROthiazide (HYDRODIURIL) 25 MG tablet TAKE ONE TABLET BY MOUTH DAILY     losartan (COZAAR) 100 MG tablet Take 1 tablet (100 mg total) by mouth daily.     multivitamin therapeutic (THERAGRAN) tablet Take 1 tablet by mouth daily.     omeprazole (PRILOSEC) 20 MG capsule TAKE ONE CAPSULE BY MOUTH DAILY.     No current facility-administered medications on file prior to visit.      History   Smoking Status     Former Smoker   Smokeless Tobacco     Never Used     Social History     Social History Narrative     Patient Care Team:  Daniel Duke MD as PCP - General  ROS  Full 10 system review including constitutional, respiratory, cardiac, gi, urinary, rheumatologic, neurologic, reproductive, dermatologic psychiatric is  performed (via questionnaire) and is negative e      Objective  Physical Exam  Vitals:    05/23/18 1120 05/23/18 1130   BP: 144/80 152/72   Patient Site: Right Arm Right Arm   Patient Position: Sitting Sitting   Cuff Size: Adult Large Adult Large   Pulse: 76    Resp: 12    Temp: 97.9  F (36.6  C)    TempSrc: Oral    Weight: (!) 237 lb 12 oz (107.8 kg)      Body mass index is 30.94 kg/(m^2).  Gen- alert, oriented/ appropriately responsive  HEENT- normal cephalic, atraumatic.   Chest- Normal inspiration and expiration.    Clear to ascultation.    No chest wall deformity or scar.  CV- Heart regular rate and  rhythm  normal tones, no murmurs   No gallops or rubs.  Ext- appear well perfused, no edema  Skin- warm and dry,   no visualized rash    Diagnostics:

## 2021-06-18 NOTE — PROGRESS NOTES
I met with Mane Ramirez at the request of Daniel Duke MD to recheck his blood pressure.  Blood pressure medications on the MAR were reviewed with patient.    Patient has taken all medications as per usual regimen: Yes  Patient reports tolerating them without any issues or concerns: No    Vitals:    06/12/18 1122 06/12/18 1124   BP: 132/80 128/80   Patient Site: Left Arm Left Arm   Patient Position: Sitting Sitting   Cuff Size: Adult Large Adult Large   Pulse: 76        Blood pressure was taken, previous encounter was reviewed, recorded blood pressure below 140/90.  Patient was discharged and the note will be sent to the provider for final review.

## 2021-06-19 NOTE — LETTER
Letter by Daniel Duke MD at      Author: Daniel Duke MD Service: -- Author Type: --    Filed:  Encounter Date: 10/8/2019 Status: Signed         Mane Ramirez  [address] [place] [address]      October 8, 2019      Dear Mane,    As a valued [facility] patient, your healthcare needs are our priority.  Your health care team has determined that you are due for an appointment regarding your medication check.    To help prevent delays in your care, please call the [facility] at [phone].    We look forward to partnering with you to achieve optimal health and wellbeing.    Sincerely,  Your care team at [facility] and [facility]

## 2021-06-20 NOTE — LETTER
Letter by Daniel Duke MD at      Author: Daniel Duke MD Service: -- Author Type: --    Filed:  Encounter Date: 9/28/2020 Status: (Other)         Mane Ramirez  [address] [place] [address]      September 28, 2020      Dear Mane,    As a valued [facility] patient, your healthcare needs are our priority.  Your health care team has determined that you are due for an appointment regarding your medication check.    To help prevent delays in your care, please call the [facility] at [phone].    We look forward to partnering with you to achieve optimal health and wellbeing.    Sincerely,  Your care team at [facility] and [facility]

## 2021-06-20 NOTE — LETTER
Letter by Daniel Duke MD at      Author: Daniel Duke MD Service: -- Author Type: --    Filed:  Encounter Date: 7/21/2020 Status: (Other)         Mane Ramirez  [address] [place] [address]      July 21, 2020      Dear Mane,    As a valued [facility] patient, your healthcare needs are our priority.  Your health care team has determined that you are due for an appointment regarding your medication.    To help prevent delays in your care, please call the [facility] at [phone].    We look forward to partnering with you to achieve optimal health and wellbeing.    Sincerely,  Your care team at [facility] and [facility]

## 2021-06-20 NOTE — LETTER
Letter by Daniel Duke MD at      Author: Daniel Duke MD Service: -- Author Type: --    Filed:  Encounter Date: 9/9/2020 Status: (Other)         Mane Ramirez  [address] [place] [address]      September 9, 2020      Dear Mane,    As a valued [facility] patient, your healthcare needs are our priority.  Your health care team has determined that you are due for an appointment regarding your office visit.    To help prevent delays in your care, please call the [facility] at [phone].    We look forward to partnering with you to achieve optimal health and wellbeing.    Sincerely,  Your care team at [facility] and [facility]

## 2021-06-20 NOTE — LETTER
Letter by Daniel Duke MD at      Author: Daniel Duke MD Service: -- Author Type: --    Filed:  Encounter Date: 4/13/2020 Status: (Other)         Mane LOCKHART Ashley  [address] [place] [address]                 April 16, 2020       Dear Mr. Ramirez,     You are due for a follow-up visit: telephone or video. Please call the clinic to schedule an appointment.      Please check blood pressure and have readings available to discuss. I have renewed the medication       for a limited time.Please call with questions or contact us using [employer].      Sincerely,        Electronically signed by Daniel Duke MD

## 2021-06-21 NOTE — LETTER
Letter by Daniel Duke MD at      Author: Daniel Duke MD Service: -- Author Type: --    Filed:  Encounter Date: 12/10/2020 Status: (Other)         Mane Ramirez  [address] [place] [address]             December 10, 2020         Dear Mr. Ramirez,    Below are the results from your recent visit:    Resulted Orders   Hepatitis C Antibody (Anti-HCV)   Result Value Ref Range    Hepatitis C Ab Negative Negative   Glycosylated Hemoglobin A1c   Result Value Ref Range    Hemoglobin A1c 5.4 <=5.6 %   Basic Metabolic Panel   Result Value Ref Range    Sodium 141 136 - 145 mmol/L    Potassium 3.9 3.5 - 5.0 mmol/L    Chloride 102 98 - 107 mmol/L    CO2 27 22 - 31 mmol/L    Anion Gap, Calculation 12 5 - 18 mmol/L    Glucose 81 70 - 125 mg/dL    Calcium 9.3 8.5 - 10.5 mg/dL    BUN 23 (H) 8 - 22 mg/dL    Creatinine 1.13 0.70 - 1.30 mg/dL    GFR MDRD Af Amer >60 >60 mL/min/1.73m2    GFR MDRD Non Af Amer >60 >60 mL/min/1.73m2    Narrative    Fasting Glucose reference range is 70-99 mg/dL per  American Diabetes Association (ADA) guidelines.   Lipid Cascade   Result Value Ref Range    Cholesterol 230 (H) <=199 mg/dL    Triglycerides 94 <=149 mg/dL    HDL Cholesterol 58 >=40 mg/dL    LDL Calculated 153 (H) <=129 mg/dL    Patient Fasting > 8hrs? Yes    ALT (SGPT)   Result Value Ref Range    ALT 26 0 - 45 U/L                Blood tests all look good, Mane      Please call with questions or contact us using [employer].    Sincerely,        Electronically signed by Daniel Duke MD

## 2021-06-23 NOTE — TELEPHONE ENCOUNTER
Refill Approved    Rx renewed per Medication Renewal Policy. Medication was last renewed on 5/30/2018.    Luana Tavarez, [facility] Connection Triage/Med Refill 1/17/2019     Requested Prescriptions   Pending Prescriptions Disp Refills     losartan (COZAAR) 100 MG tablet [Pharmacy Med Name: LOSARTAN POTASSIUM 100 MG TAB] 90 tablet 0     Sig: TAKE 1 TABLET BY MOUTH DAILY    Angiotensin Receptor Blocker Protocol Passed - 1/16/2019 11:58 AM       Passed - PCP or prescribing provider visit in past 12 months      Last office visit with prescriber/PCP: 5/23/2018 Daniel Duke MD OR same dept: 5/23/2018 Daniel Duke MD OR same specialty: 5/23/2018 Daniel Duke MD  Last physical: 8/1/2016 Last MTM visit: Visit date not found   Next visit within 3 mo: Visit date not found  Next physical within 3 mo: Visit date not found  Prescriber OR PCP: Daniel Duke MD  Last diagnosis associated with med order: 1. Essential hypertension  - losartan (COZAAR) 100 MG tablet [Pharmacy Med Name: LOSARTAN POTASSIUM 100 MG TAB]; TAKE 1 TABLET BY MOUTH DAILY  Dispense: 90 tablet; Refill: 0  - amLODIPine (NORVASC) 10 MG tablet [Pharmacy Med Name: AMLODIPINE BESYLATE 10 MG TAB]; TAKE ONE TABLET BY MOUTH ONCE DAILY  Dispense: 90 tablet; Refill: 0    If protocol passes may refill for 12 months if within 3 months of last provider visit (or a total of 15 months).            Passed - Serum potassium within the past 12 months    Lab Results   Component Value Date    Potassium 3.9 05/23/2018            Passed - Blood pressure filed in past 12 months    BP Readings from Last 1 Encounters:   06/12/18 128/80            Passed - Serum creatinine within the past 12 months    Creatinine   Date Value Ref Range Status   05/23/2018 1.02 0.70 - 1.30 mg/dL Final             amLODIPine (NORVASC) 10 MG tablet [Pharmacy Med Name: AMLODIPINE BESYLATE 10 MG TAB] 90 tablet 0     Sig: TAKE ONE TABLET BY MOUTH ONCE DAILY    Calcium-Channel  Blockers Protocol Passed - 1/16/2019 11:58 AM       Passed - PCP or prescribing provider visit in past 12 months or next 3 months    Last office visit with prescriber/PCP: 5/23/2018 Daniel Duke MD OR same dept: 5/23/2018 Daniel Duke MD OR same specialty: 5/23/2018 Daniel Duke MD  Last physical: 8/1/2016 Last MTM visit: Visit date not found   Next visit within 3 mo: Visit date not found  Next physical within 3 mo: Visit date not found  Prescriber OR PCP: Daniel Duke MD  Last diagnosis associated with med order: 1. Essential hypertension  - losartan (COZAAR) 100 MG tablet [Pharmacy Med Name: LOSARTAN POTASSIUM 100 MG TAB]; TAKE 1 TABLET BY MOUTH DAILY  Dispense: 90 tablet; Refill: 0  - amLODIPine (NORVASC) 10 MG tablet [Pharmacy Med Name: AMLODIPINE BESYLATE 10 MG TAB]; TAKE ONE TABLET BY MOUTH ONCE DAILY  Dispense: 90 tablet; Refill: 0    If protocol passes may refill for 12 months if within 3 months of last provider visit (or a total of 15 months).            Passed - Blood pressure filed in past 12 months    BP Readings from Last 1 Encounters:   06/12/18 128/80

## 2021-06-26 NOTE — PROGRESS NOTES
Assessment/ Plan  Problem List Items Addressed This Visit        High    Essential hypertension - Primary     Blood pressure well controlled on amlodipine, hydrochlorothiazide and losartan, upon second check today.  He checks his blood pressure at home periodically and generally under good control.  Last BMP was in December.  Has had very stable potassiums.  No changes, follow-up 1 year            Unprioritized    Healthcare maintenance     Has not had Covid shot, plans to get it at some point but does not want to schedule it at this time.  Not sexually active, has declined HIV test.             Subjective  CC:  Chief Complaint   Patient presents with     Follow-up     HPI:  Cecilia presents for follow-up on hypertension.  Current blood pressure medications include amlodipine 10 mg, hydrochlorothiazide 25 mg and losartan 100 mg.  He checks his blood pressure fairly regularly and indicates that most of his readings are normal.    History of hyperlipidemia.  Last lipid checked in December.  At that time, elevated LDL cholesterol on the order of 150 with good high HDL at 58.  Discussed father's history of coronary artery disease.  First MI in his 40s or 50s.    Discussed diet today.  PFSH:  Patient Active Problem List   Diagnosis     Obesity     Intermittent explosive disorder     Essential hypertension     Hyperglycemia     Autism spectrum disorder     Allergic Rhinitis     Acute Pharyngitis     Migraine Headache     Mixed hyperlipidemia     Healthcare maintenance     Current medications reviewed as follows:  Current Outpatient Medications on File Prior to Visit   Medication Sig     amLODIPine (NORVASC) 10 MG tablet TAKE ONE TABLET BY MOUTH ONCE DAILY     hydroCHLOROthiazide (HYDRODIURIL) 25 MG tablet TAKE ONE TABLET BY MOUTH DAILY     losartan (COZAAR) 100 MG tablet TAKE 1 TABLET BY MOUTH DAILY     multivitamin therapeutic (THERAGRAN) tablet Take 1 tablet by mouth daily.     omeprazole (PRILOSEC) 20 MG capsule TAKE  ONE CAPSULE BY MOUTH DAILY.     No current facility-administered medications on file prior to visit.      Social History     Tobacco Use   Smoking Status Former Smoker   Smokeless Tobacco Never Used     Social History     Social History Narrative     Not on file     Patient Care Team:  Daniel Duke MD as PCP - General  Daniel Duke MD as Assigned PCP  ROS  As above      Objective  Physical Exam  Vitals:    06/09/21 0958 06/09/21 1022   BP: 146/72 128/72   Patient Site: Left Arm    Patient Position: Sitting    Cuff Size: Adult Regular    Pulse: 75    Resp: 16    Temp: 97.7  F (36.5  C)    TempSrc: Temporal    Weight: (!) 251 lb (113.9 kg)      Body mass index is 34.04 kg/m .  Overweight, no acute distress, blood pressure rechecked and numbers listed above.  No edema.  Diagnostics:   None.  Reviewed labs from 12/9/2020.      Please note: Voice recognition software was used in this dictation.  It may therefore contain typographical errors.

## 2021-07-04 ENCOUNTER — HEALTH MAINTENANCE LETTER (OUTPATIENT)
Age: 36
End: 2021-07-04

## 2021-07-06 VITALS
HEART RATE: 75 BPM | SYSTOLIC BLOOD PRESSURE: 128 MMHG | DIASTOLIC BLOOD PRESSURE: 72 MMHG | WEIGHT: 251 LBS | TEMPERATURE: 97.7 F | RESPIRATION RATE: 16 BRPM | BODY MASS INDEX: 34.04 KG/M2

## 2021-10-21 DIAGNOSIS — I10 ESSENTIAL HYPERTENSION: ICD-10-CM

## 2021-10-21 RX ORDER — AMLODIPINE BESYLATE 10 MG/1
TABLET ORAL
Qty: 30 TABLET | Refills: 2 | Status: SHIPPED | OUTPATIENT
Start: 2021-10-21 | End: 2022-02-04

## 2021-10-22 NOTE — TELEPHONE ENCOUNTER
"Last Written Prescription Date:  10/13/2020  Last Fill Quantity: 30,  # refills: 11  Last office visit provider:  6/9/2021     Requested Prescriptions   Pending Prescriptions Disp Refills     amLODIPine (NORVASC) 10 MG tablet 30 tablet 11     Sig: [AMLODIPINE (NORVASC) 10 MG TABLET] TAKE ONE TABLET BY MOUTH ONCE DAILY       Calcium Channel Blockers Protocol  Passed - 10/21/2021  9:17 AM        Passed - Blood pressure under 140/90 in past 12 months     BP Readings from Last 3 Encounters:   06/09/21 128/72   12/09/20 132/75   10/28/20 (!) 142/80                 Passed - Recent (12 mo) or future (30 days) visit within the authorizing provider's specialty     Patient has had an office visit with the authorizing provider or a provider within the authorizing providers department within the previous 12 mos or has a future within next 30 days. See \"Patient Info\" tab in inbasket, or \"Choose Columns\" in Meds & Orders section of the refill encounter.              Passed - Medication is active on med list        Passed - Patient is age 18 or older        Passed - Normal serum creatinine on file in past 12 months     Recent Labs   Lab Test 12/09/20  1032   CR 1.13       Ok to refill medication if creatinine is low               Consuelo Hahn RN 10/21/21 11:56 PM  "

## 2021-10-24 ENCOUNTER — HEALTH MAINTENANCE LETTER (OUTPATIENT)
Age: 36
End: 2021-10-24

## 2022-01-17 DIAGNOSIS — I10 HTN (HYPERTENSION): ICD-10-CM

## 2022-01-19 RX ORDER — HYDROCHLOROTHIAZIDE 25 MG/1
TABLET ORAL
Qty: 90 TABLET | Refills: 2 | Status: SHIPPED | OUTPATIENT
Start: 2022-01-19 | End: 2022-12-15

## 2022-01-19 NOTE — TELEPHONE ENCOUNTER
"Routing refill request to provider for review/approval because:  Labs not current:  Multiple    Last Written Prescription Date:  2/18/21  Last Fill Quantity: 90,  # refills: 2   Last office visit provider:  6/9/21     Requested Prescriptions   Pending Prescriptions Disp Refills     hydrochlorothiazide (HYDRODIURIL) 25 MG tablet 90 tablet 2       Diuretics (Including Combos) Protocol Failed - 1/17/2022 10:04 AM        Failed - Normal serum creatinine on file in past 12 months     Recent Labs   Lab Test 12/09/20  1032   CR 1.13              Failed - Normal serum potassium on file in past 12 months     Recent Labs   Lab Test 12/09/20  1032   POTASSIUM 3.9                    Failed - Normal serum sodium on file in past 12 months     Recent Labs   Lab Test 12/09/20  1032                 Passed - Blood pressure under 140/90 in past 12 months     BP Readings from Last 3 Encounters:   06/09/21 128/72   12/09/20 132/75   10/28/20 (!) 142/80                 Passed - Recent (12 mo) or future (30 days) visit within the authorizing provider's specialty     Patient has had an office visit with the authorizing provider or a provider within the authorizing providers department within the previous 12 mos or has a future within next 30 days. See \"Patient Info\" tab in inbasket, or \"Choose Columns\" in Meds & Orders section of the refill encounter.              Passed - Medication is active on med list        Passed - Patient is age 18 or older             Hany Ferrari RN 01/19/22 9:36 AM  "

## 2022-02-02 DIAGNOSIS — I10 ESSENTIAL HYPERTENSION: ICD-10-CM

## 2022-02-04 RX ORDER — AMLODIPINE BESYLATE 10 MG/1
TABLET ORAL
Qty: 30 TABLET | Refills: 2 | Status: SHIPPED | OUTPATIENT
Start: 2022-02-04 | End: 2022-05-31

## 2022-02-04 NOTE — TELEPHONE ENCOUNTER
"Routing refill request to provider for review/approval because:  Labs not current:  Creatinine    Last Written Prescription Date:  10/21/21  Last Fill Quantity: 30,  # refills: 2   Last office visit provider:  6/9/21     Requested Prescriptions   Pending Prescriptions Disp Refills     amLODIPine (NORVASC) 10 MG tablet 30 tablet 2     Sig: [AMLODIPINE (NORVASC) 10 MG TABLET] TAKE ONE TABLET BY MOUTH ONCE DAILY       Calcium Channel Blockers Protocol  Failed - 2/4/2022  2:01 PM        Failed - Normal serum creatinine on file in past 12 months     Recent Labs   Lab Test 12/09/20  1032   CR 1.13       Ok to refill medication if creatinine is low          Passed - Blood pressure under 140/90 in past 12 months     BP Readings from Last 3 Encounters:   06/09/21 128/72   12/09/20 132/75   10/28/20 (!) 142/80                 Passed - Recent (12 mo) or future (30 days) visit within the authorizing provider's specialty     Patient has had an office visit with the authorizing provider or a provider within the authorizing providers department within the previous 12 mos or has a future within next 30 days. See \"Patient Info\" tab in inbasket, or \"Choose Columns\" in Meds & Orders section of the refill encounter.              Passed - Medication is active on med list        Passed - Patient is age 18 or older             Hany Ferrari RN 02/04/22 2:01 PM  "

## 2022-02-15 DIAGNOSIS — I10 ESSENTIAL HYPERTENSION: ICD-10-CM

## 2022-02-17 RX ORDER — LOSARTAN POTASSIUM 100 MG/1
TABLET ORAL
Qty: 30 TABLET | Refills: 10 | Status: SHIPPED | OUTPATIENT
Start: 2022-02-17 | End: 2023-03-31

## 2022-02-17 NOTE — TELEPHONE ENCOUNTER
"Routing refill request to provider for review/approval because:  Labs not current:  multiple    Last Written Prescription Date:  2/1/2021  Last Fill Quantity: 30,  # refills: 10   Last office visit provider:  6/9/21     Requested Prescriptions   Pending Prescriptions Disp Refills     losartan (COZAAR) 100 MG tablet 30 tablet 10     Sig: [LOSARTAN (COZAAR) 100 MG TABLET] TAKE 1 TABLET BY MOUTH DAILY       Angiotensin-II Receptors Failed - 2/15/2022  3:50 PM        Failed - Normal serum creatinine on file in past 12 months     Recent Labs   Lab Test 12/09/20  1032   CR 1.13       Ok to refill medication if creatinine is low          Failed - Normal serum potassium on file in past 12 months     Recent Labs   Lab Test 12/09/20  1032   POTASSIUM 3.9                    Passed - Last blood pressure under 140/90 in past 12 months     BP Readings from Last 3 Encounters:   06/09/21 128/72   12/09/20 132/75   10/28/20 (!) 142/80                 Passed - Recent (12 mo) or future (30 days) visit within the authorizing provider's specialty     Patient has had an office visit with the authorizing provider or a provider within the authorizing providers department within the previous 12 mos or has a future within next 30 days. See \"Patient Info\" tab in inbasket, or \"Choose Columns\" in Meds & Orders section of the refill encounter.              Passed - Medication is active on med list        Passed - Patient is age 18 or older             Miguelina Tam RN 02/17/22 3:03 PM  "

## 2022-05-26 DIAGNOSIS — I10 ESSENTIAL HYPERTENSION: ICD-10-CM

## 2022-05-27 NOTE — TELEPHONE ENCOUNTER
"Routing refill request to provider for review/approval because:  Labs not current:      Last Written Prescription Date:  2/4/22  Last Fill Quantity: 30,  # refills: 2   Last office visit provider:  6/9/21     Requested Prescriptions   Pending Prescriptions Disp Refills     amLODIPine (NORVASC) 10 MG tablet 30 tablet 2     Sig: [AMLODIPINE (NORVASC) 10 MG TABLET] TAKE ONE TABLET BY MOUTH ONCE DAILY       Calcium Channel Blockers Protocol  Failed - 5/26/2022  3:25 PM        Failed - Normal serum creatinine on file in past 12 months     Recent Labs   Lab Test 12/09/20  1032   CR 1.13       Ok to refill medication if creatinine is low          Passed - Blood pressure under 140/90 in past 12 months     BP Readings from Last 3 Encounters:   06/09/21 128/72   12/09/20 132/75   10/28/20 (!) 142/80                 Passed - Recent (12 mo) or future (30 days) visit within the authorizing provider's specialty     Patient has had an office visit with the authorizing provider or a provider within the authorizing providers department within the previous 12 mos or has a future within next 30 days. See \"Patient Info\" tab in inbasket, or \"Choose Columns\" in Meds & Orders section of the refill encounter.              Passed - Medication is active on med list        Passed - Patient is age 18 or older             Libia Kang RN 05/27/22 4:57 PM  "

## 2022-05-31 RX ORDER — AMLODIPINE BESYLATE 10 MG/1
TABLET ORAL
Qty: 30 TABLET | Refills: 2 | Status: SHIPPED | OUTPATIENT
Start: 2022-05-31 | End: 2022-09-22

## 2022-07-31 ENCOUNTER — HEALTH MAINTENANCE LETTER (OUTPATIENT)
Age: 37
End: 2022-07-31

## 2022-09-20 DIAGNOSIS — I10 ESSENTIAL HYPERTENSION: ICD-10-CM

## 2022-09-22 RX ORDER — AMLODIPINE BESYLATE 10 MG/1
TABLET ORAL
Qty: 30 TABLET | Refills: 2 | Status: SHIPPED | OUTPATIENT
Start: 2022-09-22 | End: 2023-01-18

## 2022-09-22 NOTE — TELEPHONE ENCOUNTER
"Routing refill request to provider for review/approval because:  Labs not current:  Creatinine   Patient needs to be seen because it has been more than 1 year since last office visit.  BP out of date    Last Written Prescription Date:  5/31/22  Last Fill Quantity: 30,  # refills: 2   Last office visit provider:  6/9/21     Requested Prescriptions   Pending Prescriptions Disp Refills     amLODIPine (NORVASC) 10 MG tablet 30 tablet 2     Sig: [AMLODIPINE (NORVASC) 10 MG TABLET] TAKE ONE TABLET BY MOUTH ONCE DAILY       Calcium Channel Blockers Protocol  Failed - 9/20/2022  2:32 PM        Failed - Blood pressure under 140/90 in past 12 months     BP Readings from Last 3 Encounters:   06/09/21 128/72   12/09/20 132/75   10/28/20 (!) 142/80                 Failed - Recent (12 mo) or future (30 days) visit within the authorizing provider's specialty     Patient has had an office visit with the authorizing provider or a provider within the authorizing providers department within the previous 12 mos or has a future within next 30 days. See \"Patient Info\" tab in inbasket, or \"Choose Columns\" in Meds & Orders section of the refill encounter.              Failed - Normal serum creatinine on file in past 12 months     Recent Labs   Lab Test 12/09/20  1032   CR 1.13       Ok to refill medication if creatinine is low          Passed - Medication is active on med list        Passed - Patient is age 18 or older             Sophy Viramontes RN 09/22/22 12:47 PM  "

## 2022-09-26 NOTE — ASSESSMENT & PLAN NOTE
Detail Level: Detailed Now well controlled, losartan 100, Norvasc 10, hydrochlorothiazide 25.  Check BMP.

## 2022-10-16 ENCOUNTER — HEALTH MAINTENANCE LETTER (OUTPATIENT)
Age: 37
End: 2022-10-16

## 2022-12-15 DIAGNOSIS — I10 HTN (HYPERTENSION): ICD-10-CM

## 2022-12-15 RX ORDER — HYDROCHLOROTHIAZIDE 25 MG/1
TABLET ORAL
Qty: 90 TABLET | Refills: 2 | Status: SHIPPED | OUTPATIENT
Start: 2022-12-15 | End: 2023-12-04

## 2022-12-15 NOTE — TELEPHONE ENCOUNTER
"Routing refill request to provider for review/approval because:  Patient needs to be seen because it has been more than 1 year since last office visit.    Last Written Prescription Date:  1/9/22  Last Fill Quantity: 90,  # refills: 2   Last office visit provider:  6/9/21     Requested Prescriptions   Pending Prescriptions Disp Refills     hydrochlorothiazide (HYDRODIURIL) 25 MG tablet 90 tablet 2     Sig: [HYDROCHLOROTHIAZIDE (HYDRODIURIL) 25 MG TABLET] TAKE ONE TABLET BY MOUTH DAILY       Diuretics (Including Combos) Protocol Failed - 12/15/2022  7:49 AM        Failed - Blood pressure under 140/90 in past 12 months     BP Readings from Last 3 Encounters:   06/09/21 128/72   12/09/20 132/75   10/28/20 (!) 142/80                 Failed - Recent (12 mo) or future (30 days) visit within the authorizing provider's specialty     Patient has had an office visit with the authorizing provider or a provider within the authorizing providers department within the previous 12 mos or has a future within next 30 days. See \"Patient Info\" tab in inbasket, or \"Choose Columns\" in Meds & Orders section of the refill encounter.              Failed - Normal serum creatinine on file in past 12 months     Recent Labs   Lab Test 12/09/20  1032   CR 1.13              Failed - Normal serum potassium on file in past 12 months     Recent Labs   Lab Test 12/09/20  1032   POTASSIUM 3.9                    Failed - Normal serum sodium on file in past 12 months     Recent Labs   Lab Test 12/09/20  1032                 Passed - Medication is active on med list        Passed - Patient is age 18 or older             Terese Finn RN 12/15/22 3:58 PM  "

## 2023-01-17 DIAGNOSIS — I10 ESSENTIAL HYPERTENSION: ICD-10-CM

## 2023-01-18 RX ORDER — AMLODIPINE BESYLATE 10 MG/1
TABLET ORAL
Qty: 30 TABLET | Refills: 2 | Status: SHIPPED | OUTPATIENT
Start: 2023-01-18 | End: 2023-05-05

## 2023-01-18 NOTE — TELEPHONE ENCOUNTER
"Routing refill request to provider for review/approval because:  Patient needs to be seen because it has been more than 1 year since last office visit.    Last Written Prescription Date:  9/22/22  Last Fill Quantity: 30,  # refills: 2   Last office visit provider:  6/9/21     Requested Prescriptions   Pending Prescriptions Disp Refills     amLODIPine (NORVASC) 10 MG tablet [Pharmacy Med Name: AMLODIPINE BESYLATE 10 MG T 10 Tablet] 30 tablet 2     Sig: TAKE ONE TABLET BY MOUTH ONCE DAILY       Calcium Channel Blockers Protocol  Failed - 1/17/2023 11:19 AM        Failed - Blood pressure under 140/90 in past 12 months     BP Readings from Last 3 Encounters:   06/09/21 128/72   12/09/20 132/75   10/28/20 (!) 142/80                 Failed - Recent (12 mo) or future (30 days) visit within the authorizing provider's specialty     Patient has had an office visit with the authorizing provider or a provider within the authorizing providers department within the previous 12 mos or has a future within next 30 days. See \"Patient Info\" tab in inbasket, or \"Choose Columns\" in Meds & Orders section of the refill encounter.              Failed - Normal serum creatinine on file in past 12 months     Recent Labs   Lab Test 12/09/20  1032   CR 1.13       Ok to refill medication if creatinine is low          Passed - Medication is active on med list        Passed - Patient is age 18 or older             Terese Finn, RN 01/18/23 2:29 PM  "

## 2023-03-31 DIAGNOSIS — I10 ESSENTIAL HYPERTENSION: ICD-10-CM

## 2023-03-31 RX ORDER — LOSARTAN POTASSIUM 100 MG/1
TABLET ORAL
Qty: 90 TABLET | Refills: 10 | Status: SHIPPED | OUTPATIENT
Start: 2023-03-31 | End: 2024-04-15

## 2023-03-31 NOTE — TELEPHONE ENCOUNTER
"Routing refill request to provider for review/approval because:  Colleen refills given x2 and no follow-up appointments made  Labs not current:    Patient needs to be seen because it has been more than 1 year since last office visit.  B/P isn't current  A break in medication    Last Written Prescription Date:  2/17/22  Last Fill Quantity: 30,  # refills: 10   Last office visit provider:  6/9/21     Requested Prescriptions   Pending Prescriptions Disp Refills     losartan (COZAAR) 100 MG tablet [Pharmacy Med Name: LOSARTAN POTASSIUM 100 MG T 100 Tablet] 90 tablet 10     Sig: TAKE 1 TABLET BY MOUTH DAILY       Angiotensin-II Receptors Failed - 3/31/2023 10:59 AM        Failed - Last blood pressure under 140/90 in past 12 months     BP Readings from Last 3 Encounters:   06/09/21 128/72   12/09/20 132/75   10/28/20 (!) 142/80                 Failed - Recent (12 mo) or future (30 days) visit within the authorizing provider's specialty     Patient has had an office visit with the authorizing provider or a provider within the authorizing providers department within the previous 12 mos or has a future within next 30 days. See \"Patient Info\" tab in inbasket, or \"Choose Columns\" in Meds & Orders section of the refill encounter.              Failed - Normal serum creatinine on file in past 12 months     Recent Labs   Lab Test 12/09/20  1032   CR 1.13       Ok to refill medication if creatinine is low          Failed - Normal serum potassium on file in past 12 months     Recent Labs   Lab Test 12/09/20  1032   POTASSIUM 3.9                    Passed - Medication is active on med list        Passed - Patient is age 18 or older             Libia Kang RN 03/31/23 4:35 PM  "

## 2023-05-04 DIAGNOSIS — I10 ESSENTIAL HYPERTENSION: ICD-10-CM

## 2023-05-05 RX ORDER — AMLODIPINE BESYLATE 10 MG/1
TABLET ORAL
Qty: 30 TABLET | Refills: 2 | Status: SHIPPED | OUTPATIENT
Start: 2023-05-05 | End: 2023-09-05

## 2023-05-05 NOTE — TELEPHONE ENCOUNTER
"Routing refill request to provider for review/approval because:  Patient needs to be seen because it has been more than 1 year since last office visit.    Last Written Prescription Date:  1/18/23  Last Fill Quantity: 30,  # refills: 2   Last office visit provider:  6/9/21     Requested Prescriptions   Pending Prescriptions Disp Refills     amLODIPine (NORVASC) 10 MG tablet [Pharmacy Med Name: AMLODIPINE BESYLATE 10 MG T 10 Tablet] 30 tablet 2     Sig: TAKE ONE TABLET BY MOUTH ONCE DAILY       Calcium Channel Blockers Protocol  Failed - 5/4/2023  7:19 PM        Failed - Blood pressure under 140/90 in past 12 months     BP Readings from Last 3 Encounters:   06/09/21 128/72   12/09/20 132/75   10/28/20 (!) 142/80                 Failed - Recent (12 mo) or future (30 days) visit within the authorizing provider's specialty     Patient has had an office visit with the authorizing provider or a provider within the authorizing providers department within the previous 12 mos or has a future within next 30 days. See \"Patient Info\" tab in inbasket, or \"Choose Columns\" in Meds & Orders section of the refill encounter.              Failed - Normal serum creatinine on file in past 12 months     Recent Labs   Lab Test 12/09/20  1032   CR 1.13       Ok to refill medication if creatinine is low          Passed - Medication is active on med list        Passed - Patient is age 18 or older             Terese Finn, RN 05/05/23 10:48 AM  "

## 2023-08-26 ENCOUNTER — HEALTH MAINTENANCE LETTER (OUTPATIENT)
Age: 38
End: 2023-08-26

## 2023-09-05 DIAGNOSIS — I10 ESSENTIAL HYPERTENSION: ICD-10-CM

## 2023-09-06 RX ORDER — AMLODIPINE BESYLATE 10 MG/1
10 TABLET ORAL DAILY
Qty: 30 TABLET | Refills: 2 | Status: SHIPPED | OUTPATIENT
Start: 2023-09-06 | End: 2024-01-24

## 2023-12-02 DIAGNOSIS — I10 HTN (HYPERTENSION): ICD-10-CM

## 2023-12-04 RX ORDER — HYDROCHLOROTHIAZIDE 25 MG/1
TABLET ORAL
Qty: 90 TABLET | Refills: 1 | Status: SHIPPED | OUTPATIENT
Start: 2023-12-04 | End: 2024-09-26

## 2023-12-04 NOTE — TELEPHONE ENCOUNTER
Tried to call the home number on the pt's chart, this is out of service. LVM on the line ending in [phone], if the pt calls back please relay below message and schedule appt #1

## 2024-01-24 DIAGNOSIS — I10 ESSENTIAL HYPERTENSION: ICD-10-CM

## 2024-01-24 RX ORDER — AMLODIPINE BESYLATE 10 MG/1
10 TABLET ORAL DAILY
Qty: 30 TABLET | Refills: 2 | Status: SHIPPED | OUTPATIENT
Start: 2024-01-24 | End: 2024-06-20

## 2024-04-13 DIAGNOSIS — I10 ESSENTIAL HYPERTENSION: ICD-10-CM

## 2024-04-15 RX ORDER — LOSARTAN POTASSIUM 100 MG/1
TABLET ORAL
Qty: 90 TABLET | Refills: 10 | Status: SHIPPED | OUTPATIENT
Start: 2024-04-15

## 2024-06-15 DIAGNOSIS — I10 ESSENTIAL HYPERTENSION: ICD-10-CM

## 2024-06-17 RX ORDER — AMLODIPINE BESYLATE 10 MG/1
10 TABLET ORAL DAILY
Qty: 30 TABLET | Refills: 2 | OUTPATIENT
Start: 2024-06-17

## 2024-06-20 DIAGNOSIS — I10 ESSENTIAL HYPERTENSION: ICD-10-CM

## 2024-06-20 RX ORDER — AMLODIPINE BESYLATE 10 MG/1
10 TABLET ORAL DAILY
Qty: 30 TABLET | Refills: 2 | Status: SHIPPED | OUTPATIENT
Start: 2024-06-20

## 2024-09-11 ENCOUNTER — TELEPHONE (OUTPATIENT)
Dept: FAMILY MEDICINE | Facility: CLINIC | Age: 39
End: 2024-09-11

## 2024-09-11 DIAGNOSIS — I10 HTN (HYPERTENSION): ICD-10-CM

## 2024-09-11 NOTE — TELEPHONE ENCOUNTER
Clinic RN: Please contact patient because patient should have run out of this medication on June 2024. Confirm patient is taking this medication as prescribed. Document findings and route refill encounter to provider for approval or denial.     Juliane Bedolla RN  [facility]

## 2024-09-26 RX ORDER — HYDROCHLOROTHIAZIDE 25 MG/1
TABLET ORAL
Qty: 90 TABLET | Refills: 0 | Status: SHIPPED | OUTPATIENT
Start: 2024-09-26

## 2024-09-26 NOTE — TELEPHONE ENCOUNTER
Patient's father, Yaya (CTC on file), walked in to clinic to follow-up.     Patient has not had a break in medication, had pills from previous refills at home. Patient has now run out of medication as of this morning, dad will notify patient he needs to be seen.    Triage -  Please call patient if/when refills have been authorized.    Juliane Bedolla RN  [facility]

## 2024-11-08 DIAGNOSIS — I10 ESSENTIAL HYPERTENSION: ICD-10-CM

## 2024-11-08 NOTE — LETTER
November 12, 2024      Mane Ramirez  [address] [place] [address]        Dear Mane,           As a valued [facility] patient, your healthcare needs are our priority.    Your health care team has determined that you are due for an appointment for a medication check. We encourage you to call or schedule an appointment with your primary care provider to discuss your overdue screening and schedule an appointment.     If you already have had your screening performed at another health care facility, please ask that practice to send your results to [facility] [phone] and we will update your health records. This will ensure you receive the best possible care from our providers.      If you have any questions or need help with scheduling, please call the [facility] at [phone].        Yours in health,       Your care team at [facility]

## 2024-11-19 RX ORDER — AMLODIPINE BESYLATE 10 MG/1
10 TABLET ORAL DAILY
Qty: 90 TABLET | Refills: 2 | Status: SHIPPED | OUTPATIENT
Start: 2024-11-19